# Patient Record
(demographics unavailable — no encounter records)

---

## 2024-12-06 NOTE — HISTORY OF PRESENT ILLNESS
[FreeTextEntry8] : 37 year old female presents with complaint of rash on right arm that started 2 days ago. Pt states rash is painful and itchy. She reports 2 weeks ago having similar rash on her right buttocks that has now dried over. Pt denies any recent stressors at this time.

## 2024-12-06 NOTE — PHYSICAL EXAM
[Normal] : no acute distress, well nourished, well developed and well-appearing [de-identified] : mutliple vesicles and erythema on medial right arm below the antcubital fossa.

## 2024-12-06 NOTE — PHYSICAL EXAM
[Normal] : no acute distress, well nourished, well developed and well-appearing [de-identified] : mutliple vesicles and erythema on medial right arm below the antcubital fossa.

## 2024-12-18 NOTE — PHYSICAL EXAM
[Normal] : uterus [No Bleeding] : there was no active vaginal bleeding [Uterine Adnexae] : were not tender and not enlarged [FreeTextEntry4] : + inclusion cyst located on left internal vaginal wall.  subcentimeter in size.

## 2024-12-18 NOTE — HISTORY OF PRESENT ILLNESS
[Localized] : a localized [Lesion] : lesion [Vagina] : vagina [Definite:  ___ (Date)] : the last menstrual period was [unfilled]

## 2024-12-28 NOTE — INTERPRETER SERVICES
[Patient Declined  Services] : - None: Patient declined  services [FreeTextEntry3] : common language English

## 2024-12-28 NOTE — HISTORY OF PRESENT ILLNESS
[FreeTextEntry1] : shingles, low back pain [de-identified] : 38 yo female presenting for follow up of shingles. She says she completed the 5 days of valacyclovir, and the rash improved very quickly. This is her third time having shingles in her life. Had chickenpox as a child. She is also complaining that for 2 nights she has been having low back pain. No incontinence, no dysuria. She has been taking tylenol for it

## 2024-12-28 NOTE — PHYSICAL EXAM
[Normal] : no acute distress, well nourished, well developed and well-appearing [No CVA Tenderness] : no CVA  tenderness [No Spinal Tenderness] : no spinal tenderness [de-identified] : low back paraspinal tenderness to deep palpation [de-identified] : resolved rash, residual hyperpigmentation mild

## 2024-12-28 NOTE — HISTORY OF PRESENT ILLNESS
[FreeTextEntry1] : shingles, low back pain [de-identified] : 38 yo female presenting for follow up of shingles. She says she completed the 5 days of valacyclovir, and the rash improved very quickly. This is her third time having shingles in her life. Had chickenpox as a child. She is also complaining that for 2 nights she has been having low back pain. No incontinence, no dysuria. She has been taking tylenol for it

## 2024-12-28 NOTE — PHYSICAL EXAM
[Normal] : no acute distress, well nourished, well developed and well-appearing [No CVA Tenderness] : no CVA  tenderness [No Spinal Tenderness] : no spinal tenderness [de-identified] : low back paraspinal tenderness to deep palpation [de-identified] : resolved rash, residual hyperpigmentation mild

## 2025-02-28 NOTE — ASSESSMENT
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

## 2025-02-28 NOTE — REVIEW OF SYSTEMS
[Itching] : Itching [Skin Rash] : skin rash [Negative] : Respiratory [Fever] : no fever [Chills] : no chills [Dysuria] : no dysuria [Hematuria] : no hematuria [Frequency] : no frequency [Vaginal Discharge] : no vaginal discharge [de-identified] : Genital area and left buttock

## 2025-02-28 NOTE — END OF VISIT
[] : Resident [FreeTextEntry3] : Patient seen and examined along with resident. Speculum exam performed by me at patient's request. Additional STI workup performed. 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 Patient scheduled for LEEP procedure at [place] next week.

## 2025-02-28 NOTE — PHYSICAL EXAM
[External Female Genitalia] : normal external genitalia [Vagina] : normal vaginal exam [Chaperone Present] : A chaperone was present in the examining room during all aspects of the physical examination [Normal] : normal rate, regular rhythm, normal S1 and S2 and no murmur heard [de-identified] : +multiple flesh-colored papules lateral to left labia. Not erythematous or fluid filled. On speculum exam, no masses present on walls of vagina or cervix [FreeTextEntry2] : Dr Foss [de-identified] : No skin lesion on buttocks, skin dryness/irritation intergluteal fold

## 2025-02-28 NOTE — HISTORY OF PRESENT ILLNESS
[FreeTextEntry8] : Patient with hx of multiple shingles?, not confirmed by testing, presenting for rash in genital area.   Last episode reported in December, patient was treated with acyclovir x5 days, felt better, no recurrence up until now. Last December rash was in right elbow and left buttock, no hx of immunocompromise. Now, patient has had itching/burning in genital area for 2 days.  Patient reports 1 male, long-term, sexual partner, no condom use. Distant hx of chlamydia, treated.

## 2025-02-28 NOTE — REVIEW OF SYSTEMS
Natalie [address] Patient Status:  Inpatient    1932 MRN [id number]   [facility] 3SW-A Attending Jarad Cintron MD   [facility] Day # 2 PCP Tony Chauhan MD     Hal Landin is a 80year old female patient.     Pankaj pressure 128/42, pulse 59, temperature 98.2 °F (36.8 °C), temperature source Oral, resp. rate 18, weight 132 lb 4.4 oz (60 kg), SpO2 98 %, currently breastfeeding. Subjective:  Symptoms:  Stable. Diet:  Adequate intake.     Activity level: Impaired [Itching] : Itching [Skin Rash] : skin rash [Negative] : Respiratory [Fever] : no fever [Chills] : no chills [Dysuria] : no dysuria [Hematuria] : no hematuria [Frequency] : no frequency [Vaginal Discharge] : no vaginal discharge [de-identified] : Genital area and left buttock

## 2025-02-28 NOTE — END OF VISIT
[] : Resident [FreeTextEntry3] : Patient seen and examined along with resident. Speculum exam performed by me at patient's request. Additional STI workup performed. 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 Patient scheduled for LEEP procedure at [place] next week.

## 2025-02-28 NOTE — PHYSICAL EXAM
[External Female Genitalia] : normal external genitalia [Vagina] : normal vaginal exam [Chaperone Present] : A chaperone was present in the examining room during all aspects of the physical examination [Normal] : normal rate, regular rhythm, normal S1 and S2 and no murmur heard [de-identified] : +multiple flesh-colored papules lateral to left labia. Not erythematous or fluid filled. On speculum exam, no masses present on walls of vagina or cervix [FreeTextEntry2] : Dr Foss [de-identified] : No skin lesion on buttocks, skin dryness/irritation intergluteal fold

## 2025-03-06 NOTE — PROCEDURE
[Colposcopy] : Colposcopy  [Time out performed] : Pre-procedure time out performed.  Patient's name, date of birth and procedure confirmed. [Consent Obtained] : Consent obtained [Risks] : risks [Benefits] : benefits [Alternatives] : alternatives [Patient] : patient [HGSIL] : HGSIL [HPV High Risk] : HPV high risk [No Premedication] : no premedication [Colposcopy Adequate] : colposcopy adequate [SCI Fully Visualized] : SCI fully visualized [Lesion] : lesion seen [Hemostasis Obtained] : Hemostasis obtained [Tolerated Well] : the patient tolerated the procedure well [Pap Performed] : pap not performed [ECC Performed] : ECC not performed [Biopsy] : biopsy not taken [de-identified] : Small vulvar genital wart about 0.5cm in diameter located on right labial fold [de-identified] : acetowhite changes near cervicovaginal junction on right vaginal wall.  [de-identified] : 1 [de-identified] : Biopsy obtained in the 9'o clock position of cervix  [de-identified] : Biopsy obtained in the 9' o clock position of cervix as there were acetowhite changes in this region. Cervix otherwise stenotic and scarred from prior excisional procedure. [de-identified] : Monsel's solution placed on area of biopsy site with good hemostasis noted.  [de-identified] : Given acetowhite changes on right side of cervix and vaginal wall, high suspicion for recurrent cervical dysplasia, will await final biopsy result for further patient counseling.

## 2025-03-06 NOTE — PLAN
[FreeTextEntry1] : 36 y/o P2 presents to colposcopy clinic for repeat colposcopy in the setting of persistent HR HPV infection. Patient had a cone biopsy in June 2024 that was positive for GHULAM II with normal pap smear 6 months later. Acetowhite changes noted in 9 o clock region of cervix and along vaginal wall at the time of colposcopy. Cervical biopsy obtained in this region. Patient also states that she recently saw an GYN provider at [facility] where she had a vulvar biopsy for a lesion seen on her vulva, patient still awaiting results.   -f/u biopsy results.  -patient received Gardasil vaccine #1 during this visit.   Patient seen and evaluated with MAJOR Smalls, PGY-4

## 2025-05-21 NOTE — HISTORY OF PRESENT ILLNESS
[de-identified] : 37F PMHx HPV, genital herpes, CIN2, presents for a CPE. Patient complains of mild LLQ pain for the last few months. States the pain comes on in cycles, lasts for about 3 days a week and then goes away. States the pain is stronger around her menstrual periods. The patient states Ibuprofin helps with the pain.

## 2025-05-21 NOTE — PHYSICAL EXAM
[No Lymphadenopathy] : no lymphadenopathy [Soft] : abdomen soft [Non-distended] : non-distended [No Masses] : no abdominal mass palpated [No HSM] : no HSM [Normal Bowel Sounds] : normal bowel sounds [No Hernias] : no hernias [No Spinal Tenderness] : no spinal tenderness [No Joint Swelling] : no joint swelling [Grossly Normal Strength/Tone] : grossly normal strength/tone [Normal] : affect was normal and insight and judgment were intact [de-identified] : +LLQ tenderness to palpation